# Patient Record
Sex: MALE | Race: WHITE | NOT HISPANIC OR LATINO | Employment: STUDENT | URBAN - NONMETROPOLITAN AREA
[De-identification: names, ages, dates, MRNs, and addresses within clinical notes are randomized per-mention and may not be internally consistent; named-entity substitution may affect disease eponyms.]

---
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
## 2024-03-02 NOTE — PROGRESS NOTES
Patient returns following tonsillectomy and adenoidectomy. Is having no problems, eating and drinking well, no bleeding.    Exam: Oral cavity shows moist mucosa. Pharynx shows minimal residual fibrinous exudate, no blood or clot.    Assessment: Status post tonsillectomy and adenoidectomy satisfactory course.    Plan: Resume unrestricted diet and activity  . Follow up with me on a prn basis.  Callback issues and was not there is any problems particularly he has trouble breathing through his nose because of his deviated septum  PEDRO Salomon MD  
Airway patent

## (undated) DEVICE — STERILE POLYISOPRENE POWDER-FREE SURGICAL GLOVES WITH EMOLLIENT COATING: Brand: PROTEXIS

## (undated) DEVICE — SOL IRR NACL 0.9PCT BT 1000ML

## (undated) DEVICE — TP SXN YANKR BLB TIP W/TBG 10F LF STRL

## (undated) DEVICE — MAD T & A: Brand: MEDLINE INDUSTRIES, INC.

## (undated) DEVICE — GLV SURG SENSICARE POLYISPRN W/ALOE PF LF 6 GRN STRL

## (undated) DEVICE — SUT GUT CHRM 3/0 SH 27IN G122H

## (undated) DEVICE — COAGULATOR SXN HNDSWITCH 10F16IN

## (undated) DEVICE — GLV SURG TRIUMPH LT PF LTX 7.5 STRL

## (undated) DEVICE — TRY IRR

## (undated) DEVICE — ELECTRD BLD EXT EDGE/INSUL 6IN